# Patient Record
Sex: FEMALE | Race: WHITE
[De-identification: names, ages, dates, MRNs, and addresses within clinical notes are randomized per-mention and may not be internally consistent; named-entity substitution may affect disease eponyms.]

---

## 2021-01-04 ENCOUNTER — TRANSCRIBE ORDERS (OUTPATIENT)
Dept: OTHER | Age: 21
End: 2021-01-04

## 2021-01-04 DIAGNOSIS — Z30.46 ENCOUNTER FOR NEXPLANON REMOVAL: Primary | ICD-10-CM

## 2021-01-07 ENCOUNTER — TELEPHONE (OUTPATIENT)
Dept: OBGYN | Facility: CLINIC | Age: 21
End: 2021-01-07

## 2021-01-07 DIAGNOSIS — Z30.46 ENCOUNTER FOR SURVEILLANCE OF NEXPLANON SUBDERMAL CONTRACEPTIVE: Primary | ICD-10-CM

## 2021-01-07 NOTE — TELEPHONE ENCOUNTER
Spoke with Dr. Mahajan and helped to arrange appointment in radiology and in clinic for removal. Spoke with patient's mother and they are agreeable to the plan. Patient will go to radiology at 9:30am on wed 1/13 and Dr. Mahajan will be present for the radiology appointment. They will then walk to clinic where it will be removed.           [facility] Call Center     Phone Message     May a detailed message be left on voicemail: yes     Reason for Call: Other: Mother Sarah Beth Henderson [phone] mobile called on behalf of Pt Dixie. Dixie referred by Geraldine Read at [facility] to Dr. Mahajan for non-palpable nexplanon removal. Pt is leaving for college on 01/15/2021 so mother is requesting callback for appointment prior to that day / expedited request. Per protocol call center unable to schedule Nexplenon removal procedures, referred to [facility]  for scheduling. Please call Sarah Beth for scheduling Dixie for procedure.     Action Taken: Other: [facility]

## 2021-01-13 ENCOUNTER — HOSPITAL ENCOUNTER (OUTPATIENT)
Dept: ULTRASOUND IMAGING | Facility: CLINIC | Age: 21
End: 2021-01-13
Attending: OBSTETRICS & GYNECOLOGY
Payer: COMMERCIAL

## 2021-01-13 ENCOUNTER — OFFICE VISIT (OUTPATIENT)
Dept: OBGYN | Facility: CLINIC | Age: 21
End: 2021-01-13
Attending: OBSTETRICS & GYNECOLOGY
Payer: COMMERCIAL

## 2021-01-13 VITALS
HEIGHT: 67 IN | SYSTOLIC BLOOD PRESSURE: 121 MMHG | WEIGHT: 156.3 LBS | DIASTOLIC BLOOD PRESSURE: 73 MMHG | HEART RATE: 83 BPM | BODY MASS INDEX: 24.53 KG/M2

## 2021-01-13 DIAGNOSIS — Z30.46 ENCOUNTER FOR SURVEILLANCE OF NEXPLANON SUBDERMAL CONTRACEPTIVE: ICD-10-CM

## 2021-01-13 DIAGNOSIS — Z30.46 ENCOUNTER FOR NEXPLANON REMOVAL: Primary | ICD-10-CM

## 2021-01-13 PROCEDURE — G0463 HOSPITAL OUTPT CLINIC VISIT: HCPCS

## 2021-01-13 PROCEDURE — 76882 US LMTD JT/FCL EVL NVASC XTR: CPT | Mod: LT

## 2021-01-13 PROCEDURE — 76882 US LMTD JT/FCL EVL NVASC XTR: CPT | Mod: 26 | Performed by: RADIOLOGY

## 2021-01-13 ASSESSMENT — MIFFLIN-ST. JEOR: SCORE: 1516.6

## 2021-01-13 NOTE — LETTER
2021       RE: Dixie Henderson  [address]  [facility] [address]     Dear Colleague,    Thank you for referring your patient, Dixie Henderson, to the [facility] at [facility]. Please see a copy of my visit note below.    PROCEDURE NOTE: CONTRACEPTIVE DEEP IMPLANT REMOVAL     2021    PATIENT INFORMATION:   Subjective: Had implant placed ~1 year ago after spontaneous , for the year she has had 3 weeks of bleeding/month, has really just tired of this pattern, ready for removal.  Presented to Ob/Gyn and implant was not palpable.  Had IUD placed for contraception following desired implant removal.  Had ultrasound in Radiology for localization/marking with implant depth noted to be 3.5 mm, prefasical.    OB History    Para Term  AB Living   1 0 0 0 1 0   SAB TAB Ectopic Multiple Live Births   1 0 0 0 0      # Outcome Date GA Lbr Roddy/2nd Weight Sex Delivery Anes PTL Lv   1 SAB                PREOPERATIVE DIAGNOSIS: Undesired side effects    POSTOPERATIVE DIAGNOSIS: Undesired side effects    PROCEDURE PERFORMED: Contraceptive implant removal    ANESTHESIA: 1% lidocaine, 2 mL    CONSENT:   She has been counseled regarding the risks, benefits and alternatives to implant removal.  Her questions have been answered.  Written informed consent was obtained.     UNIVERSAL PROTOCOL/ TIMEOUT: Preprocedure verification is complete- patient verified and consents confirmed, procedure sites are identified and marked and timeout was called before the start of the procedure.     PROCEDURE DETAILS:   The patient was placed in a supine position with her left arm flexed so that the inside of the upper arm was flat.  Marking noted from Radiology suite.  Implant not palpable pre-procedure.  The left upper arm was prepped with betadine solution.     Lidocaine 1% was injected at the midpoint of the marking on the patient's skin and  down toward the fasica.      Once the area was numb, a 5 mm parallel incision was performed in the midportion of the marking on the patient's skin, curved mosquito forceps was used to dissect down to the implant and was grasped and brought to incision.  Scalpel was used to dissect off connective tissue capsule and the implant was removed intact and shown to the patient.  The incision was closed with steri-strips and a band aid and a pressure dressing applied.      PLAN:   The patient plans IUD for contraception.  The patient tolerated the procedure well.  Wound care instructions were reviewed.  Follow up prn.    Increased physician technical skill, time and complexity involved for this implant removal than when the procedure is normally performed given implant was not palpable.    Trish Mahajan MD, MPH

## 2021-01-13 NOTE — PROGRESS NOTES
PROCEDURE NOTE: CONTRACEPTIVE DEEP IMPLANT REMOVAL     2021    PATIENT INFORMATION:   Subjective: Had implant placed ~1 year ago after spontaneous , for the year she has had 3 weeks of bleeding/month, has really just tired of this pattern, ready for removal.  Presented to Ob/Gyn and implant was not palpable.  Had IUD placed for contraception following desired implant removal.  Had ultrasound in Radiology for localization/marking with implant depth noted to be 3.5 mm, prefasical.    OB History    Para Term  AB Living   1 0 0 0 1 0   SAB TAB Ectopic Multiple Live Births   1 0 0 0 0      # Outcome Date GA Lbr Roddy/2nd Weight Sex Delivery Anes PTL Lv   1 SAB                PREOPERATIVE DIAGNOSIS: Undesired side effects    POSTOPERATIVE DIAGNOSIS: Undesired side effects    PROCEDURE PERFORMED: Contraceptive implant removal    ANESTHESIA: 1% lidocaine, 2 mL    CONSENT:   She has been counseled regarding the risks, benefits and alternatives to implant removal.  Her questions have been answered.  Written informed consent was obtained.     UNIVERSAL PROTOCOL/ TIMEOUT: Preprocedure verification is complete- patient verified and consents confirmed, procedure sites are identified and marked and timeout was called before the start of the procedure.     PROCEDURE DETAILS:   The patient was placed in a supine position with her left arm flexed so that the inside of the upper arm was flat.  Marking noted from Radiology suite.  Implant not palpable pre-procedure.  The left upper arm was prepped with betadine solution.     Lidocaine 1% was injected at the midpoint of the marking on the patient's skin and down toward the fasica.      Once the area was numb, a 5 mm parallel incision was performed in the midportion of the marking on the patient's skin, curved mosquito forceps was used to dissect down to the implant and was grasped and brought to incision.  Scalpel was used to dissect off connective tissue  capsule and the implant was removed intact and shown to the patient.  The incision was closed with steri-strips and a band aid and a pressure dressing applied.      PLAN:   The patient plans IUD for contraception.  The patient tolerated the procedure well.  Wound care instructions were reviewed.  Follow up prn.    Increased physician technical skill, time and complexity involved for this implant removal than when the procedure is normally performed given implant was not palpable.    Trish Mahajan MD, MPH